# Patient Record
Sex: MALE | Race: WHITE
[De-identification: names, ages, dates, MRNs, and addresses within clinical notes are randomized per-mention and may not be internally consistent; named-entity substitution may affect disease eponyms.]

---

## 2020-02-04 ENCOUNTER — HOSPITAL ENCOUNTER (EMERGENCY)
Dept: HOSPITAL 8 - ED | Age: 30
Discharge: HOME | End: 2020-02-04
Payer: MEDICAID

## 2020-02-04 VITALS — HEIGHT: 66 IN | BODY MASS INDEX: 31.53 KG/M2 | WEIGHT: 196.21 LBS

## 2020-02-04 VITALS — SYSTOLIC BLOOD PRESSURE: 111 MMHG | DIASTOLIC BLOOD PRESSURE: 67 MMHG

## 2020-02-04 DIAGNOSIS — R10.32: ICD-10-CM

## 2020-02-04 DIAGNOSIS — K52.9: Primary | ICD-10-CM

## 2020-02-04 DIAGNOSIS — F17.210: ICD-10-CM

## 2020-02-04 LAB
ALBUMIN SERPL-MCNC: 3.7 G/DL (ref 3.4–5)
ANION GAP SERPL CALC-SCNC: 4 MMOL/L (ref 5–15)
BASOPHILS # BLD AUTO: 0.03 X10^3/UL (ref 0–0.1)
BASOPHILS NFR BLD AUTO: 0 % (ref 0–1)
CALCIUM SERPL-MCNC: 8.5 MG/DL (ref 8.5–10.1)
CHLORIDE SERPL-SCNC: 106 MMOL/L (ref 98–107)
CREAT SERPL-MCNC: 1.42 MG/DL (ref 0.7–1.3)
CULTURE INDICATED?: NO
EOSINOPHIL # BLD AUTO: 0.12 X10^3/UL (ref 0–0.4)
EOSINOPHIL NFR BLD AUTO: 2 % (ref 1–7)
ERYTHROCYTE [DISTWIDTH] IN BLOOD BY AUTOMATED COUNT: 13.6 % (ref 9.4–14.8)
LYMPHOCYTES # BLD AUTO: 1.27 X10^3/UL (ref 1–3.4)
LYMPHOCYTES NFR BLD AUTO: 16 % (ref 22–44)
MCH RBC QN AUTO: 30.4 PG (ref 27.5–34.5)
MCHC RBC AUTO-ENTMCNC: 33.5 G/DL (ref 33.2–36.2)
MCV RBC AUTO: 90.8 FL (ref 81–97)
MD: NO
MICROSCOPIC: (no result)
MONOCYTES # BLD AUTO: 0.85 X10^3/UL (ref 0.2–0.8)
MONOCYTES NFR BLD AUTO: 10 % (ref 2–9)
NEUTROPHILS # BLD AUTO: 5.89 X10^3/UL (ref 1.8–6.8)
NEUTROPHILS NFR BLD AUTO: 72 % (ref 42–75)
PLATELET # BLD AUTO: 257 X10^3/UL (ref 130–400)
PMV BLD AUTO: 9.8 FL (ref 7.4–10.4)
RBC # BLD AUTO: 4.88 X10^6/UL (ref 4.38–5.82)

## 2020-02-04 PROCEDURE — 96374 THER/PROPH/DIAG INJ IV PUSH: CPT

## 2020-02-04 PROCEDURE — 36415 COLL VENOUS BLD VENIPUNCTURE: CPT

## 2020-02-04 PROCEDURE — 99284 EMERGENCY DEPT VISIT MOD MDM: CPT

## 2020-02-04 PROCEDURE — 81001 URINALYSIS AUTO W/SCOPE: CPT

## 2020-02-04 PROCEDURE — 80048 BASIC METABOLIC PNL TOTAL CA: CPT

## 2020-02-04 PROCEDURE — 82040 ASSAY OF SERUM ALBUMIN: CPT

## 2020-02-04 PROCEDURE — 74177 CT ABD & PELVIS W/CONTRAST: CPT

## 2020-02-04 PROCEDURE — 85025 COMPLETE CBC W/AUTO DIFF WBC: CPT

## 2020-02-04 PROCEDURE — 96375 TX/PRO/DX INJ NEW DRUG ADDON: CPT

## 2020-03-29 ENCOUNTER — HOSPITAL ENCOUNTER (EMERGENCY)
Dept: HOSPITAL 8 - ED | Age: 30
Discharge: HOME | End: 2020-03-29
Payer: MEDICAID

## 2020-03-29 VITALS — HEIGHT: 65 IN | BODY MASS INDEX: 33.9 KG/M2 | WEIGHT: 203.49 LBS

## 2020-03-29 VITALS — DIASTOLIC BLOOD PRESSURE: 90 MMHG | SYSTOLIC BLOOD PRESSURE: 132 MMHG

## 2020-03-29 DIAGNOSIS — K02.9: Primary | ICD-10-CM

## 2020-03-29 DIAGNOSIS — K08.89: ICD-10-CM

## 2020-03-29 DIAGNOSIS — F17.200: ICD-10-CM

## 2020-03-29 PROCEDURE — 99284 EMERGENCY DEPT VISIT MOD MDM: CPT

## 2020-03-29 PROCEDURE — 99283 EMERGENCY DEPT VISIT LOW MDM: CPT

## 2020-03-29 PROCEDURE — 64400 NJX AA&/STRD TRIGEMINAL NRV: CPT

## 2020-03-29 NOTE — NUR
Received report, assumed patient care. Patient has received dental block. 
Awaiting labs and prescriptions and disposition

## 2021-02-10 ENCOUNTER — NON-PROVIDER VISIT (OUTPATIENT)
Dept: URGENT CARE | Facility: PHYSICIAN GROUP | Age: 31
End: 2021-02-10

## 2021-02-10 DIAGNOSIS — Z02.1 PRE-EMPLOYMENT DRUG SCREENING: Primary | ICD-10-CM

## 2021-02-10 LAB
AMP AMPHETAMINE: NORMAL
COC COCAINE: NORMAL
INT CON NEG: NORMAL
INT CON POS: NORMAL
MET METHAMPHETAMINES: NORMAL
OPI OPIATES: NORMAL
PCP PHENCYCLIDINE: NORMAL
POC DRUG COMMENT 753798-OCCUPATIONAL HEALTH: NEGATIVE
THC: NORMAL

## 2021-02-10 PROCEDURE — 80305 DRUG TEST PRSMV DIR OPT OBS: CPT | Performed by: PHYSICIAN ASSISTANT

## 2021-07-04 ENCOUNTER — HOSPITAL ENCOUNTER (EMERGENCY)
Dept: HOSPITAL 8 - ED | Age: 31
LOS: 1 days | Discharge: HOME | End: 2021-07-05
Payer: MEDICAID

## 2021-07-04 VITALS — BODY MASS INDEX: 31.8 KG/M2 | HEIGHT: 67 IN | WEIGHT: 202.6 LBS

## 2021-07-04 DIAGNOSIS — R00.0: ICD-10-CM

## 2021-07-04 DIAGNOSIS — R10.9: ICD-10-CM

## 2021-07-04 DIAGNOSIS — F17.210: ICD-10-CM

## 2021-07-04 DIAGNOSIS — R06.02: ICD-10-CM

## 2021-07-04 DIAGNOSIS — F41.1: ICD-10-CM

## 2021-07-04 DIAGNOSIS — J45.31: Primary | ICD-10-CM

## 2021-07-04 DIAGNOSIS — R11.10: ICD-10-CM

## 2021-07-04 PROCEDURE — 99283 EMERGENCY DEPT VISIT LOW MDM: CPT

## 2021-07-04 PROCEDURE — 94640 AIRWAY INHALATION TREATMENT: CPT

## 2021-07-04 PROCEDURE — 71045 X-RAY EXAM CHEST 1 VIEW: CPT

## 2021-07-04 PROCEDURE — 93005 ELECTROCARDIOGRAM TRACING: CPT

## 2021-07-04 NOTE — NUR
PT LAYING IN BED, PT FEELING BETTER, PTS BREATHING NOT LABORED ANYMORE, 
MEDICATIONS GIVEN AND PT STATED HE IS FEELING BETTER, A/OX4, ALL NEEDS IN 
REACH, CALL LIGHT IN REACH, NAD AT THIS TIME, VSS

## 2021-07-04 NOTE — NUR
PT PRESENTS TO ER FOR AN ASTHMA ATTACK, PT [place] SPEAKING ONLY, THIS RN USED 
AN  WHOSE NAME IS LATONYA AND USER ID WAS [id number], PT STATED THIS STARTED 
YESTERDAY AND DOES NOT HAVE AN INHALER, PT HAD LABORED BREATHING AND ANXIETY, 
PT STATED IN ENGLISH "I DONT WANT TO DIE", MD AT BEDSIDE

## 2021-07-05 VITALS — DIASTOLIC BLOOD PRESSURE: 77 MMHG | SYSTOLIC BLOOD PRESSURE: 115 MMHG

## 2021-07-20 ENCOUNTER — NON-PROVIDER VISIT (OUTPATIENT)
Dept: OCCUPATIONAL MEDICINE | Facility: CLINIC | Age: 31
End: 2021-07-20

## 2021-07-20 DIAGNOSIS — Z02.1 PRE-EMPLOYMENT DRUG SCREENING: ICD-10-CM

## 2021-07-20 LAB
AMP AMPHETAMINE: NORMAL
COC COCAINE: NORMAL
INT CON NEG: NORMAL
INT CON POS: NORMAL
MET METHAMPHETAMINES: NORMAL
OPI OPIATES: NORMAL
PCP PHENCYCLIDINE: NORMAL
POC DRUG COMMENT 753798-OCCUPATIONAL HEALTH: NORMAL
THC: NORMAL

## 2021-07-20 PROCEDURE — 80305 DRUG TEST PRSMV DIR OPT OBS: CPT | Performed by: NURSE PRACTITIONER

## 2021-09-22 ENCOUNTER — NON-PROVIDER VISIT (OUTPATIENT)
Dept: OCCUPATIONAL MEDICINE | Facility: CLINIC | Age: 31
End: 2021-09-22

## 2021-09-22 DIAGNOSIS — Z02.83 ENCOUNTER FOR DRUG SCREENING: Primary | ICD-10-CM

## 2021-09-22 PROCEDURE — 8907 PR URINE COLLECT ONLY: Performed by: NURSE PRACTITIONER

## 2021-09-28 ENCOUNTER — OFFICE VISIT (OUTPATIENT)
Dept: OCCUPATIONAL MEDICINE | Facility: CLINIC | Age: 31
End: 2021-09-28

## 2021-09-28 VITALS
HEART RATE: 68 BPM | DIASTOLIC BLOOD PRESSURE: 82 MMHG | BODY MASS INDEX: 32.65 KG/M2 | WEIGHT: 196 LBS | SYSTOLIC BLOOD PRESSURE: 118 MMHG | HEIGHT: 65 IN | OXYGEN SATURATION: 96 %

## 2021-09-28 DIAGNOSIS — Z02.4 ENCOUNTER FOR COMMERCIAL DRIVER MEDICAL EXAMINATION (CDME): ICD-10-CM

## 2021-09-28 PROCEDURE — 7100 PR DOT PHYSICAL: Performed by: NURSE PRACTITIONER

## 2021-09-28 NOTE — PROGRESS NOTES
[facility] No major medical history, no chronic conditions, no chronic medications. No history of asthma, heart disease, seizure disorder or syncopal episodes with activity. Please see the chart for further information, however cleared for 's license for 2 years without restrictions.